# Patient Record
(demographics unavailable — no encounter records)

---

## 2025-03-20 NOTE — HISTORY OF PRESENT ILLNESS
[de-identified] : Hx bilat metachronous breast CA.  In , Rt WLE and WBRT for DCIS.  17 Lt WLE and SLN, 3 SLN neg, IDC 0.35 cm, mod diff (3, 2, 1) no LVI, DCIS, papillary solid and micropapillary types with intermediate nuclear grade, final margins neg, ER > 50%, NC > 50% and HER-2/celestine 0.  Postoperatively received whole breast RT.  Pt has been on Anastrozole since 2018 and tolerating well.  Workup for question palpable density Rt breast done 22, Rt mammo and US, stable bx-proven fibroadenoma, stable hypoechoic avascular mass, and posttraumatic oil cyst, no sonographic correlate identified at 11 o'clock, no morphologically abnormal LNs.  Pt was seen by Dr. Madden 22 - exam was unremarkable for density.  Pt has been seen by Dr. Zarate and Prolia has been recommended for known osteoporosis seen on BMD 22.  Pt fell and fractured Rt shoulder .  CT scan of the chest, abdomen and pelvis 10/19/22, stable multiple pulmonary nodules, new micronodule Rt upper lobe, post radiation fibrosis, Lt parapelvic and cortical renal cysts no change, atherosclerotic calcification abdominal aorta, leiomyomatous uterus and 1.3 Rt ovarian cyst stable in size compared to 21 US, hemangioma L3 vertebral body.  Bone scan 10/14/22, no evidence of metastatic disease, uptake noted Rt humeral head neck junction, which may reflect prior fracture. 23, has not taken Prolia.  Bilat mammo/US 22 NICKI.  Chest CT 23, stable pulmonary nodules.  Had vaginal staining once in March, saw GYN and had vaginal US due to vaginal prolapse.  Urinary frequency improved.  Continues to have back spasm, followed at [facility].  Trying to lose wt.  Sister diagnosed with breast CA at age 68.  5/15/23, BRCA1/2 with Ambry breast Next panel, VUS BARD1 gene.  23 bilat breast MRI, NICKI.  3/21/23, US of the pelvis, transabdominal and transvaginal- intramural and subserosal uterine leiomyomata, 1  decreased in size and 3 are stable, stable ovarian cyst.  10/25/23, no new complaints.  Continues to have occasional back spasm.  Difficulty losing wt.  Sister also on anti-estrogen ORS 21.  Had  flu vax and COVID vax.  Mother is Niya and lives in [place].  24- stopped anastrazole late 2023. Doesn't feel different. Sched to  urogyn  for prolapse. Sched for colonoscopy in August. Saw Dr Zarate re BMD - pt refusing antiresorptive rx. Mother 98 in [place] with pts sister - notes stress- c/o anxiety, S/P mohs L Lower lid for BCC  ALLERGIES: IV contrast - migratory arthritis, adhesives.  PMH: Menarche 14, LMP 54, G1, P0, AB1.  GYN .  OCP distant past 3 M.  Up-to-date  flu vax and COVID booster.  Tonsillectomy, childhood .  Rt breast fibroadenoma, Lt trigger finger 1st digit repair, D&C ,  Rt nephrectomy for clear cell CA.  Colonoscopy .   BMD done by Dr Zarate, osteoporosis.  Hx hypothyroid.  Has healthcare proxy and advance directive.  FHX: BRCA neg, breast next VUS BARD 1. [place]ph[place] and [place]ken[place] descent.  Mother 97, had 2 sisters, 1  of lung CA, another childbirth.  Maternal grandfather had lung CA.  Father  heart disease 63, his sister had breast CA 63 and  of other causes, and her daughter had breast CA in her 40s.  Pt has 3 other paternal aunts and 1 paternal uncle.  Pt's sister had breast CA at 35, alive and well at 71, BRCA neg, another sister br yang 68 and1 other sisters.  Pt's niece BRCA neg.  SHX: Retired, almost PhD immunology involved with litigation.  Smoked 1/2 PPD 5 Y, quit , rare alcohol, lives with , heterosexual - not active, physically active, treadmill 3/week  hour,  Walks4 miles 4/week.  [de-identified] : 3/20/25  saw [facility]ro gyn [facility] uterine prolapse- urine freq occ back spasm - T spine - present many yrs- 10/24 [facility] ED - was seen at [facility] - has brace Followed by Dr Zarate - BMD  osteoporosis and osteopenia 12/24 bilat MMG/US annual f/u Bilat MRI -7/25 For kidney US today - monitoring script Mother will be 100 8/24 colonoscopy

## 2025-03-20 NOTE — PHYSICAL EXAM
[Fully active, able to carry on all pre-disease performance without restriction] : Status 0 - Fully active, able to carry on all pre-disease performance without restriction [Obese] : obese [Normal] : affect appropriate [de-identified] : bilat pendulous, s/p Rt wle wbrt,,s/p Lt WLE/RT - no mass tenderness or discharge

## 2025-03-20 NOTE — PHYSICAL EXAM
[Fully active, able to carry on all pre-disease performance without restriction] : Status 0 - Fully active, able to carry on all pre-disease performance without restriction [Obese] : obese [Normal] : affect appropriate [de-identified] : bilat pendulous, s/p Rt wle wbrt,,s/p Lt WLE/RT - no mass tenderness or discharge

## 2025-03-20 NOTE — BEGINNING OF VISIT
[0] : 2) Feeling down, depressed, or hopeless: Not at all (0) [Pain Scale: ___] : On a scale of 1-10, today the patient's pain is a(n) [unfilled]. [Former] : Former [20 or more] : 20 or more [> 15 Years] : > 15 Years [Date Discussed (MM/DD/YY): ___] : Discussed: [unfilled] [Diarrhea Character] : Diarrhea: Grade 0 [[id number]] : 0 [Reviewed, no changes] : Reviewed, no changes [Abdominal Pain] : no abdominal pain [Vomiting] : no vomiting [Constipation] : no constipation

## 2025-03-20 NOTE — HISTORY OF PRESENT ILLNESS
[de-identified] : Hx bilat metachronous breast CA.  In , Rt WLE and WBRT for DCIS.  17 Lt WLE and SLN, 3 SLN neg, IDC 0.35 cm, mod diff (3, 2, 1) no LVI, DCIS, papillary solid and micropapillary types with intermediate nuclear grade, final margins neg, ER > 50%, GA > 50% and HER-2/celestine 0.  Postoperatively received whole breast RT.  Pt has been on Anastrozole since 2018 and tolerating well.  Workup for question palpable density Rt breast done 22, Rt mammo and US, stable bx-proven fibroadenoma, stable hypoechoic avascular mass, and posttraumatic oil cyst, no sonographic correlate identified at 11 o'clock, no morphologically abnormal LNs.  Pt was seen by Dr. Madden 22 - exam was unremarkable for density.  Pt has been seen by Dr. Zarate and Prolia has been recommended for known osteoporosis seen on BMD 22.  Pt fell and fractured Rt shoulder .  CT scan of the chest, abdomen and pelvis 10/19/22, stable multiple pulmonary nodules, new micronodule Rt upper lobe, post radiation fibrosis, Lt parapelvic and cortical renal cysts no change, atherosclerotic calcification abdominal aorta, leiomyomatous uterus and 1.3 Rt ovarian cyst stable in size compared to 21 US, hemangioma L3 vertebral body.  Bone scan 10/14/22, no evidence of metastatic disease, uptake noted Rt humeral head neck junction, which may reflect prior fracture. 23, has not taken Prolia.  Bilat mammo/US 22 NICKI.  Chest CT 23, stable pulmonary nodules.  Had vaginal staining once in March, saw GYN and had vaginal US due to vaginal prolapse.  Urinary frequency improved.  Continues to have back spasm, followed at [facility].  Trying to lose wt.  Sister diagnosed with breast CA at age 68.  5/15/23, BRCA1/2 with Ambry breast Next panel, VUS BARD1 gene.  23 bilat breast MRI, NICKI.  3/21/23, US of the pelvis, transabdominal and transvaginal- intramural and subserosal uterine leiomyomata, 1  decreased in size and 3 are stable, stable ovarian cyst.  10/25/23, no new complaints.  Continues to have occasional back spasm.  Difficulty losing wt.  Sister also on anti-estrogen ORS 21.  Had  flu vax and COVID vax.  Mother is Niya and lives in [place].  24- stopped anastrazole late 2023. Doesn't feel different. Sched to  urogyn  for prolapse. Sched for colonoscopy in August. Saw Dr Zarate re BMD - pt refusing antiresorptive rx. Mother 98 in [place] with pts sister - notes stress- c/o anxiety, S/P mohs L Lower lid for BCC  ALLERGIES: IV contrast - migratory arthritis, adhesives.  PMH: Menarche 14, LMP 54, G1, P0, AB1.  GYN .  OCP distant past 3 M.  Up-to-date  flu vax and COVID booster.  Tonsillectomy, childhood .  Rt breast fibroadenoma, Lt trigger finger 1st digit repair, D&C ,  Rt nephrectomy for clear cell CA.  Colonoscopy .   BMD done by Dr Zarate, osteoporosis.  Hx hypothyroid.  Has healthcare proxy and advance directive.  FHX: BRCA neg, breast next VUS BARD 1. Citizen of [place] Seph[place] and [place] descent.  Mother 97, had 2 sisters, 1  of lung CA, another childbirth.  Maternal grandfather had lung CA.  Father  heart disease 63, his sister had breast CA 63 and  of other causes, and her daughter had breast CA in her 40s.  Pt has 3 other paternal aunts and 1 paternal uncle.  Pt's sister had breast CA at 35, alive and well at 71, BRCA neg, another sister br yang 68 and1 other sisters.  Pt's niece BRCA neg.  SHX: Retired, almost PhD immunology involved with litigation.  Smoked 1/2 PPD 5 Y, quit , rare alcohol, lives with , heterosexual - not active, physically active, treadmill 3/week  hour,  Walks4 miles 4/week.  [de-identified] : 3/20/25  saw uro gyn [facility] uterine prolapse- urine freq occ back spasm - T spine - present many yrs- 10/24 [facility] ED - was seen at [facility] - has brace Followed by Dr Zarate - BMD  osteoporosis and osteopenia 12/24 bilat MMG/US annual f/u Bilat MRI -7/25 For kidney US today - monitoring script Mother will be 100 8/24 colonoscopy

## 2025-03-20 NOTE — PHYSICAL EXAM
[Fully active, able to carry on all pre-disease performance without restriction] : Status 0 - Fully active, able to carry on all pre-disease performance without restriction [Obese] : obese [Normal] : affect appropriate [de-identified] : bilat pendulous, s/p Rt wle wbrt,,s/p Lt WLE/RT - no mass tenderness or discharge

## 2025-03-20 NOTE — PHYSICAL EXAM
[Fully active, able to carry on all pre-disease performance without restriction] : Status 0 - Fully active, able to carry on all pre-disease performance without restriction [Obese] : obese [Normal] : affect appropriate [de-identified] : bilat pendulous, s/p Rt wle wbrt,,s/p Lt WLE/RT - no mass tenderness or discharge

## 2025-03-20 NOTE — HISTORY OF PRESENT ILLNESS
[de-identified] : Hx bilat metachronous breast CA.  In , Rt WLE and WBRT for DCIS.  17 Lt WLE and SLN, 3 SLN neg, IDC 0.35 cm, mod diff (3, 2, 1) no LVI, DCIS, papillary solid and micropapillary types with intermediate nuclear grade, final margins neg, ER > 50%, WY > 50% and HER-2/celestine 0.  Postoperatively received whole breast RT.  Pt has been on Anastrozole since 2018 and tolerating well.  Workup for question palpable density Rt breast done 22, Rt mammo and US, stable bx-proven fibroadenoma, stable hypoechoic avascular mass, and posttraumatic oil cyst, no sonographic correlate identified at 11 o'clock, no morphologically abnormal LNs.  Pt was seen by Dr. Madden 22 - exam was unremarkable for density.  Pt has been seen by Dr. Zarate and Prolia has been recommended for known osteoporosis seen on BMD 22.  Pt fell and fractured Rt shoulder .  CT scan of the chest, abdomen and pelvis 10/19/22, stable multiple pulmonary nodules, new micronodule Rt upper lobe, post radiation fibrosis, Lt parapelvic and cortical renal cysts no change, atherosclerotic calcification abdominal aorta, leiomyomatous uterus and 1.3 Rt ovarian cyst stable in size compared to 21 US, hemangioma L3 vertebral body.  Bone scan 10/14/22, no evidence of metastatic disease, uptake noted Rt humeral head neck junction, which may reflect prior fracture. 23, has not taken Prolia.  Bilat mammo/US 22 NICKI.  Chest CT 23, stable pulmonary nodules.  Had vaginal staining once in March, saw GYN and had vaginal US due to vaginal prolapse.  Urinary frequency improved.  Continues to have back spasm, followed at [facility].  Trying to lose wt.  Sister diagnosed with breast CA at age 68.  5/15/23, BRCA1/2 with Ambry breast Next panel, VUS BARD1 gene.  23 bilat breast MRI, NICKI.  3/21/23, US of the pelvis, transabdominal and transvaginal- intramural and subserosal uterine leiomyomata, 1  decreased in size and 3 are stable, stable ovarian cyst.  10/25/23, no new complaints.  Continues to have occasional back spasm.  Difficulty losing wt.  Sister also on anti-estrogen ORS 21.  Had  flu vax and COVID vax.  Mother is Niya and lives in [place].  24- stopped anastrazole late 2023. Doesn't feel different. Sched to  urogyn  for prolapse. Sched for colonoscopy in August. Saw Dr Zarate re BMD - pt refusing antiresorptive rx. Mother 98 in [place] with pts sister - notes stress- c/o anxiety, S/P mohs L Lower lid for BCC  ALLERGIES: IV contrast - migratory arthritis, adhesives.  PMH: Menarche 14, LMP 54, G1, P0, AB1.  GYN .  OCP distant past 3 M.  Up-to-date  flu vax and COVID booster.  Tonsillectomy, childhood .  Rt breast fibroadenoma, Lt trigger finger 1st digit repair, D&C ,  Rt nephrectomy for clear cell CA.  Colonoscopy .   BMD done by Dr Zarate, osteoporosis.  Hx hypothyroid.  Has healthcare proxy and advance directive.  FHX: BRCA neg, breast next VUS BARD 1. [place]ph[place] and [place]ken[place] descent.  Mother 97, had 2 sisters, 1  of lung CA, another childbirth.  Maternal grandfather had lung CA.  Father  heart disease 63, his sister had breast CA 63 and  of other causes, and her daughter had breast CA in her 40s.  Pt has 3 other paternal aunts and 1 paternal uncle.  Pt's sister had breast CA at 35, alive and well at 71, BRCA neg, another sister br yang 68 and1 other sisters.  Pt's niece BRCA neg.  SHX: Retired, almost PhD immunology involved with litigation.  Smoked 1/2 PPD 5 Y, quit , rare alcohol, lives with , heterosexual - not active, physically active, treadmill 3/week  hour,  Walks4 miles 4/week.  [de-identified] : 3/20/25  saw uro gyn [facility] uterine prolapse- urine freq occ back spasm - T spine - present many yrs- 10/24 [facility] ED - was seen at [facility] - has brace Followed by Dr Zarate - BMD  osteoporosis and osteopenia 12/24 bilat MMG/US annual f/u Bilat MRI -7/25 For kidney US today - monitoring script Mother will be 100 8/24 colonoscopy

## 2025-03-20 NOTE — ASSESSMENT
[With Patient/Caregiver] : With Patient/Caregiver [Designated Health Care Proxy] : Designated Health Care Proxy [Relationship: ___] : Relationship: [unfilled] [Name: ___] : Name: [unfilled] [FreeTextEntry1] : Breast next VUS BARD 1, + fam hx. Hx Bilat metachronous breast CA, Rt DCIS 1997, Lt breast T1aN0 - mod diff IDC, ER/IN pos and HER-2/celestine neg.  Path and prog reviewed in detail.  S/P 5 y AI completed 11/24   l Continue enhanced surveillance.  MRI 7/25. Reviewed path and prog.  BMD as per Dr Zarate  Reviewed diet and exercise.  Wt loss encouraged.  Osteoporosis -reluctant to take antiresorptive therapy.  Rationale for antiresorptive therapy was discussed in detail Wt reduction and regular exercise were discussed.  Bilat MMG/US 12/25   MRI 7/25.Ssituational anxiety regarding care of mother. F/U 6 mo [AdvancecareDate] : 3/20/25

## 2025-03-20 NOTE — HISTORY OF PRESENT ILLNESS
[de-identified] : Hx bilat metachronous breast CA.  In , Rt WLE and WBRT for DCIS.  17 Lt WLE and SLN, 3 SLN neg, IDC 0.35 cm, mod diff (3, 2, 1) no LVI, DCIS, papillary solid and micropapillary types with intermediate nuclear grade, final margins neg, ER > 50%, NC > 50% and HER-2/celestine 0.  Postoperatively received whole breast RT.  Pt has been on Anastrozole since 2018 and tolerating well.  Workup for question palpable density Rt breast done 22, Rt mammo and US, stable bx-proven fibroadenoma, stable hypoechoic avascular mass, and posttraumatic oil cyst, no sonographic correlate identified at 11 o'clock, no morphologically abnormal LNs.  Pt was seen by Dr. Madden 22 - exam was unremarkable for density.  Pt has been seen by Dr. Zarate and Prolia has been recommended for known osteoporosis seen on BMD 22.  Pt fell and fractured Rt shoulder .  CT scan of the chest, abdomen and pelvis 10/19/22, stable multiple pulmonary nodules, new micronodule Rt upper lobe, post radiation fibrosis, Lt parapelvic and cortical renal cysts no change, atherosclerotic calcification abdominal aorta, leiomyomatous uterus and 1.3 Rt ovarian cyst stable in size compared to 21 US, hemangioma L3 vertebral body.  Bone scan 10/14/22, no evidence of metastatic disease, uptake noted Rt humeral head neck junction, which may reflect prior fracture. 23, has not taken Prolia.  Bilat mammo/US 22 NICKI.  Chest CT 23, stable pulmonary nodules.  Had vaginal staining once in March, saw GYN and had vaginal US due to vaginal prolapse.  Urinary frequency improved.  Continues to have back spasm, followed at [facility].  Trying to lose wt.  Sister diagnosed with breast CA at age 68.  5/15/23, BRCA1/2 with Ambry breast Next panel, VUS BARD1 gene.  23 bilat breast MRI, NICKI.  3/21/23, US of the pelvis, transabdominal and transvaginal- intramural and subserosal uterine leiomyomata, 1  decreased in size and 3 are stable, stable ovarian cyst.  10/25/23, no new complaints.  Continues to have occasional back spasm.  Difficulty losing wt.  Sister also on anti-estrogen ORS 21.  Had  flu vax and COVID vax.  Mother is Niya and lives in [place].  24- stopped anastrazole late 2023. Doesn't feel different. Sched to  urogyn  for prolapse. Sched for colonoscopy in August. Saw Dr Zarate re BMD - pt refusing antiresorptive rx. Mother 98 in [place] with pts sister - notes stress- c/o anxiety, S/P mohs L Lower lid for BCC  ALLERGIES: IV contrast - migratory arthritis, adhesives.  PMH: Menarche 14, LMP 54, G1, P0, AB1.  GYN .  OCP distant past 3 M.  Up-to-date  flu vax and COVID booster.  Tonsillectomy, childhood .  Rt breast fibroadenoma, Lt trigger finger 1st digit repair, D&C ,  Rt nephrectomy for clear cell CA.  Colonoscopy .   BMD done by Dr Zarate, osteoporosis.  Hx hypothyroid.  Has healthcare proxy and advance directive.  FHX: BRCA neg, breast next VUS BARD 1. [place] and [place]ken[place] descent.  Mother 97, had 2 sisters, 1  of lung CA, another childbirth.  Maternal grandfather had lung CA.  Father  heart disease 63, his sister had breast CA 63 and  of other causes, and her daughter had breast CA in her 40s.  Pt has 3 other paternal aunts and 1 paternal uncle.  Pt's sister had breast CA at 35, alive and well at 71, BRCA neg, another sister br yang 68 and1 other sisters.  Pt's niece BRCA neg.  SHX: Retired, almost PhD immunology involved with litigation.  Smoked 1/2 PPD 5 Y, quit , rare alcohol, lives with , heterosexual - not active, physically active, treadmill 3/week  hour,  Walks4 miles 4/week.  [de-identified] : 3/20/25  saw [facility]ro gyn [facility] uterine prolapse- urine freq occ back spasm - T spine - present many yrs- 10/24 [facility] ED - was seen at [facility] - has brace Followed by Dr Zarate - BMD  osteoporosis and osteopenia 12/24 bilat MMG/US annual f/u Bilat MRI -7/25 For kidney US today - monitoring script Mother will be 100 8/24 colonoscopy

## 2025-03-20 NOTE — BEGINNING OF VISIT
[0] : 2) Feeling down, depressed, or hopeless: Not at all (0) [Pain Scale: ___] : On a scale of 1-10, today the patient's pain is a(n) [unfilled]. [Former] : Former [20 or more] : 20 or more [> 15 Years] : > 15 Years [Date Discussed (MM/DD/YY): ___] : Discussed: [unfilled] [Diarrhea Character] : Diarrhea: Grade 0 [[id number]] : 0 [Reviewed, no changes] : Reviewed, no changes [Abdominal Pain] : no abdominal pain [Vomiting] : no vomiting [Constipation] : no constipation

## 2025-03-20 NOTE — PHYSICAL EXAM
[Fully active, able to carry on all pre-disease performance without restriction] : Status 0 - Fully active, able to carry on all pre-disease performance without restriction [Obese] : obese [Normal] : affect appropriate [de-identified] : bilat pendulous, s/p Rt wle wbrt,,s/p Lt WLE/RT - no mass tenderness or discharge

## 2025-03-20 NOTE — ASSESSMENT
[With Patient/Caregiver] : With Patient/Caregiver [Designated Health Care Proxy] : Designated Health Care Proxy [Relationship: ___] : Relationship: [unfilled] [Name: ___] : Name: [unfilled] [FreeTextEntry1] : Breast next VUS BARD 1, + fam hx. Hx Bilat metachronous breast CA, Rt DCIS 1997, Lt breast T1aN0 - mod diff IDC, ER/ID pos and HER-2/celestine neg.  Path and prog reviewed in detail.  S/P 5 y AI completed 11/24   l Continue enhanced surveillance.  MRI 7/25. Reviewed path and prog.  BMD as per Dr Zarate  Reviewed diet and exercise.  Wt loss encouraged.  Osteoporosis -reluctant to take antiresorptive therapy.  Rationale for antiresorptive therapy was discussed in detail Wt reduction and regular exercise were discussed.  Bilat MMG/US 12/25   MRI 7/25.Ssituational anxiety regarding care of mother. F/U 6 mo [AdvancecareDate] : 3/20/25

## 2025-03-20 NOTE — ASSESSMENT
[With Patient/Caregiver] : With Patient/Caregiver [Designated Health Care Proxy] : Designated Health Care Proxy [Relationship: ___] : Relationship: [unfilled] [Name: ___] : Name: [unfilled] [FreeTextEntry1] : Breast next VUS BARD 1, + fam hx. Hx Bilat metachronous breast CA, Rt DCIS 1997, Lt breast T1aN0 - mod diff IDC, ER/GA pos and HER-2/celestine neg.  Path and prog reviewed in detail.  S/P 5 y AI completed 11/24   l Continue enhanced surveillance.  MRI 7/25. Reviewed path and prog.  BMD as per Dr Zarate  Reviewed diet and exercise.  Wt loss encouraged.  Osteoporosis -reluctant to take antiresorptive therapy.  Rationale for antiresorptive therapy was discussed in detail Wt reduction and regular exercise were discussed.  Bilat MMG/US 12/25   MRI 7/25.Ssituational anxiety regarding care of mother. F/U 6 mo [AdvancecareDate] : 3/20/25

## 2025-03-20 NOTE — REVIEW OF SYSTEMS
[Diarrhea: Grade 0] : Diarrhea: Grade 0 [Negative] : Allergic/Immunologic [Insomnia] : no insomnia [Anxiety] : no anxiety [FreeTextEntry8] : no change freq [FreeTextEntry9] : back spasms occ

## 2025-03-20 NOTE — HISTORY OF PRESENT ILLNESS
[de-identified] : Hx bilat metachronous breast CA.  In , Rt WLE and WBRT for DCIS.  17 Lt WLE and SLN, 3 SLN neg, IDC 0.35 cm, mod diff (3, 2, 1) no LVI, DCIS, papillary solid and micropapillary types with intermediate nuclear grade, final margins neg, ER > 50%, WY > 50% and HER-2/celestine 0.  Postoperatively received whole breast RT.  Pt has been on Anastrozole since 2018 and tolerating well.  Workup for question palpable density Rt breast done 22, Rt mammo and US, stable bx-proven fibroadenoma, stable hypoechoic avascular mass, and posttraumatic oil cyst, no sonographic correlate identified at 11 o'clock, no morphologically abnormal LNs.  Pt was seen by Dr. Madden 22 - exam was unremarkable for density.  Pt has been seen by Dr. Zarate and Prolia has been recommended for known osteoporosis seen on BMD 22.  Pt fell and fractured Rt shoulder .  CT scan of the chest, abdomen and pelvis 10/19/22, stable multiple pulmonary nodules, new micronodule Rt upper lobe, post radiation fibrosis, Lt parapelvic and cortical renal cysts no change, atherosclerotic calcification abdominal aorta, leiomyomatous uterus and 1.3 Rt ovarian cyst stable in size compared to 21 US, hemangioma L3 vertebral body.  Bone scan 10/14/22, no evidence of metastatic disease, uptake noted Rt humeral head neck junction, which may reflect prior fracture. 23, has not taken Prolia.  Bilat mammo/US 22 NICKI.  Chest CT 23, stable pulmonary nodules.  Had vaginal staining once in March, saw GYN and had vaginal US due to vaginal prolapse.  Urinary frequency improved.  Continues to have back spasm, followed at [facility].  Trying to lose wt.  Sister diagnosed with breast CA at age 68.  5/15/23, BRCA1/2 with Ambry breast Next panel, VUS BARD1 gene.  23 bilat breast MRI, NICKI.  3/21/23, US of the pelvis, transabdominal and transvaginal- intramural and subserosal uterine leiomyomata, 1  decreased in size and 3 are stable, stable ovarian cyst.  10/25/23, no new complaints.  Continues to have occasional back spasm.  Difficulty losing wt.  Sister also on anti-estrogen ORS 21.  Had  flu vax and COVID vax.  Mother is Niya and lives in [place].  24- stopped anastrazole late 2023. Doesn't feel different. Sched to  urogyn  for prolapse. Sched for colonoscopy in August. Saw Dr Zarate re BMD - pt refusing antiresorptive rx. Mother 98 in [place] with pts sister - notes stress- c/o anxiety, S/P mohs L Lower lid for BCC  ALLERGIES: IV contrast - migratory arthritis, adhesives.  PMH: Menarche 14, LMP 54, G1, P0, AB1.  GYN .  OCP distant past 3 M.  Up-to-date  flu vax and COVID booster.  Tonsillectomy, childhood .  Rt breast fibroadenoma, Lt trigger finger 1st digit repair, D&C ,  Rt nephrectomy for clear cell CA.  Colonoscopy .   BMD done by Dr Zarate, osteoporosis.  Hx hypothyroid.  Has healthcare proxy and advance directive.  FHX: BRCA neg, breast next VUS BARD 1. [place] and [place] descent.  Mother 97, had 2 sisters, 1  of lung CA, another childbirth.  Maternal grandfather had lung CA.  Father  heart disease 63, his sister had breast CA 63 and  of other causes, and her daughter had breast CA in her 40s.  Pt has 3 other paternal aunts and 1 paternal uncle.  Pt's sister had breast CA at 35, alive and well at 71, BRCA neg, another sister br yang 68 and1 other sisters.  Pt's niece BRCA neg.  SHX: Retired, almost PhD immunology involved with litigation.  Smoked 1/2 PPD 5 Y, quit , rare alcohol, lives with , heterosexual - not active, physically active, treadmill 3/week  hour,  Walks4 miles 4/week.  [de-identified] : 3/20/25  saw [facility]ro gyn [facility] uterine prolapse- urine freq occ back spasm - T spine - present many yrs- 10/24 [facility] ED - was seen at [facility]"" - has brace Followed by Dr Zarate - BMD  osteoporosis and osteopenia 12/24 bilat MMG/US annual f/u Bilat MRI -7/25 For kidney US today - monitoring script Mother will be 100 8/24 colonoscopy

## 2025-03-20 NOTE — BEGINNING OF VISIT
[0] : 2) Feeling down, depressed, or hopeless: Not at all (0) [Pain Scale: ___] : On a scale of 1-10, today the patient's pain is a(n) [unfilled]. [Former] : Former [20 or more] : 20 or more [> 15 Years] : > 15 Years [Date Discussed (MM/DD/YY): ___] : Discussed: [unfilled] [Diarrhea Character] : Diarrhea: Grade 0 [[id number]] : 0 [Reviewed, no changes] : Reviewed, no changes [Abdominal Pain] : no abdominal pain [Vomiting] : no vomiting [Constipation] : no constipation

## 2025-03-20 NOTE — BEGINNING OF VISIT
[0] : 2) Feeling down, depressed, or hopeless: Not at all (0) [Pain Scale: ___] : On a scale of 1-10, today the patient's pain is a(n) [unfilled]. [Former] : Former [20 or more] : 20 or more [> 15 Years] : > 15 Years [Date Discussed (MM/DD/YY): ___] : Discussed: [unfilled] [Diarrhea Character] : Diarrhea: Grade 0 [[id number]] : 0 [Reviewed, no changes] : Reviewed, no changes [Abdominal Pain] : no abdominal pain [Vomiting] : no vomiting [Constipation] : no constipation

## 2025-03-20 NOTE — ASSESSMENT
[With Patient/Caregiver] : With Patient/Caregiver [Designated Health Care Proxy] : Designated Health Care Proxy [Relationship: ___] : Relationship: [unfilled] [Name: ___] : Name: [unfilled] [FreeTextEntry1] : Breast next VUS BARD 1, + fam hx. 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  Path and prog reviewed in detail.  S/P 5 y AI completed 11/24   l Continue enhanced surveillance.  MRI 7/25. Reviewed path and prog.  BMD as per Dr Zarate  Reviewed diet and exercise.  Wt loss encouraged.  Osteoporosis -reluctant to take antiresorptive therapy.  Rationale for antiresorptive therapy was discussed in detail Wt reduction and regular exercise were discussed.  Bilat MMG/US 12/25   MRI 7/25.Ssituational anxiety regarding care of mother. F/U 6 mo [AdvancecareDate] : 3/20/25

## 2025-03-20 NOTE — BEGINNING OF VISIT
[0] : 2) Feeling down, depressed, or hopeless: Not at all (0) [Pain Scale: ___] : On a scale of 1-10, today the patient's pain is a(n) [unfilled]. [Former] : Former [20 or more] : 20 or more [> 15 Years] : > 15 Years [Date Discussed (MM/DD/YY): ___] : Discussed: [unfilled] [Diarrhea Character] : Diarrhea: Grade 0 [[id number]] : 0 [Reviewed, no changes] : Reviewed, no changes [Abdominal Pain] : no abdominal pain [Vomiting] : no vomiting [Constipation] : no constipation